# Patient Record
Sex: FEMALE | Race: BLACK OR AFRICAN AMERICAN | Employment: FULL TIME | URBAN - METROPOLITAN AREA
[De-identification: names, ages, dates, MRNs, and addresses within clinical notes are randomized per-mention and may not be internally consistent; named-entity substitution may affect disease eponyms.]

---

## 2017-01-11 ENCOUNTER — TELEPHONE (OUTPATIENT)
Dept: OBGYN CLINIC | Facility: CLINIC | Age: 27
End: 2017-01-11

## 2017-02-10 ENCOUNTER — HOSPITAL ENCOUNTER (OUTPATIENT)
Dept: PERINATAL CARE | Facility: HOSPITAL | Age: 27
Discharge: HOME | End: 2017-02-10
Attending: OBSTETRICS & GYNECOLOGY

## 2017-02-10 VITALS — DIASTOLIC BLOOD PRESSURE: 72 MMHG | SYSTOLIC BLOOD PRESSURE: 119 MMHG | HEART RATE: 88 BPM

## 2017-02-10 DIAGNOSIS — O43.122 INSERTION, VELAMENTOUS, UMBILICAL CORD, SECOND TRIMESTER: ICD-10-CM

## 2017-02-10 DIAGNOSIS — O43.122 INSERTION, VELAMENTOUS, UMBILICAL CORD, SECOND TRIMESTER: Primary | ICD-10-CM

## 2017-02-10 DIAGNOSIS — Z36.3 SCREENING, ANTENATAL, FOR MALFORMATION BY ULTRASOUND: ICD-10-CM

## 2017-02-10 PROCEDURE — 99214 OFFICE O/P EST MOD 30 MIN: CPT | Performed by: OBSTETRICS & GYNECOLOGY

## 2017-02-10 PROCEDURE — 76811 OB US DETAILED SNGL FETUS: CPT

## 2017-02-10 PROCEDURE — 76817 TRANSVAGINAL US OBSTETRIC: CPT | Performed by: OBSTETRICS & GYNECOLOGY

## 2017-02-10 PROCEDURE — 76811 OB US DETAILED SNGL FETUS: CPT | Performed by: OBSTETRICS & GYNECOLOGY

## 2017-02-10 NOTE — PROGRESS NOTES
Yovani Bush is a 27year old female. Reason for Consult:   Velamentous cord insertion seen on previous ultrasound. OB History:        NARRATIVE:   /72 mmHg  Pulse 88  LMP 2016          Alert and Oriented.   No acute distress Velamentous cords contain a single umbilical artery in 02.8 percent of cases.    Velamentous umbilical cord has been associated with several obstetrical complications, including fetal growth restriction, prematurity, congenital anomalies, low Apgar scores,

## 2017-02-11 PROBLEM — O26.879 SHORT CERVIX AFFECTING PREGNANCY: Status: ACTIVE | Noted: 2017-02-11

## 2017-02-14 ENCOUNTER — NURSE ONLY (OUTPATIENT)
Dept: OBGYN CLINIC | Facility: CLINIC | Age: 27
End: 2017-02-14

## 2017-02-14 ENCOUNTER — TELEPHONE (OUTPATIENT)
Dept: OBGYN CLINIC | Facility: CLINIC | Age: 27
End: 2017-02-14

## 2017-02-14 VITALS — BODY MASS INDEX: 26.34 KG/M2 | HEIGHT: 64.25 IN | WEIGHT: 154.25 LBS

## 2017-02-14 DIAGNOSIS — Z34.02 ENCOUNTER FOR SUPERVISION OF NORMAL FIRST PREGNANCY IN SECOND TRIMESTER: Primary | ICD-10-CM

## 2017-02-14 PROCEDURE — 99211 OFF/OP EST MAY X REQ PHY/QHP: CPT | Performed by: ADVANCED PRACTICE MIDWIFE

## 2017-02-14 RX ORDER — CHOLECALCIFEROL (VITAMIN D3) 25 MCG
1 TABLET,CHEWABLE ORAL DAILY
COMMUNITY
End: 2020-05-04

## 2017-02-14 NOTE — PROGRESS NOTES
NOB Education complete and information given to pt. NOB Labs ordered with AFP. Pt completed EPDS and scored 3. Per MJ, pt will schedule NPN appt with the MD's at [facility]. Contact information given to pt to call for appt. Pt desires Natural Childbirth.

## 2017-02-14 NOTE — TELEPHONE ENCOUNTER
Pt here for NOB RN visit. Talked with pt and notified that in light of the velamentous cord insertion and short cervix she has risked out of our care and we will transfer her to OB's.  To cont with Ed visit today and given contact info for MD's to schedule

## 2017-02-16 ENCOUNTER — HOSPITAL ENCOUNTER (OUTPATIENT)
Dept: PERINATAL CARE | Facility: HOSPITAL | Age: 27
Discharge: HOME | End: 2017-02-16
Attending: OBSTETRICS & GYNECOLOGY

## 2017-02-16 VITALS
HEART RATE: 100 BPM | BODY MASS INDEX: 26 KG/M2 | SYSTOLIC BLOOD PRESSURE: 123 MMHG | DIASTOLIC BLOOD PRESSURE: 71 MMHG | WEIGHT: 154.38 LBS | RESPIRATION RATE: 16 BRPM

## 2017-02-16 DIAGNOSIS — O26.879 SHORT CERVIX AFFECTING PREGNANCY: ICD-10-CM

## 2017-02-16 DIAGNOSIS — O43.122 INSERTION, VELAMENTOUS, UMBILICAL CORD, SECOND TRIMESTER: ICD-10-CM

## 2017-02-16 DIAGNOSIS — O26.879 SHORT CERVIX AFFECTING PREGNANCY: Primary | ICD-10-CM

## 2017-02-16 PROCEDURE — 76815 OB US LIMITED FETUS(S): CPT | Performed by: OBSTETRICS & GYNECOLOGY

## 2017-02-16 PROCEDURE — 76817 TRANSVAGINAL US OBSTETRIC: CPT

## 2017-02-16 PROCEDURE — 76817 TRANSVAGINAL US OBSTETRIC: CPT | Performed by: OBSTETRICS & GYNECOLOGY

## 2017-02-16 PROCEDURE — 99213 OFFICE O/P EST LOW 20 MIN: CPT | Performed by: OBSTETRICS & GYNECOLOGY

## 2017-02-16 NOTE — PROGRESS NOTES
MFM follow up, hx of short cervix. Cervical length  Positive fetal movement.   Feeling well, no complaints offered

## 2017-02-16 NOTE — PROGRESS NOTES
Muriel Patel is a 27year old female. Reason for Consult:   Velamentous cord insertion seen on previous ultrasound. Short cervix. Doing well. No complaints.     OB History:        NARRATIVE:   /71 mmHg  Pulse 100  Resp 16  Wt 154 lb 6.4 discussion with the patient.

## 2017-03-16 ENCOUNTER — TELEPHONE (OUTPATIENT)
Dept: OBGYN CLINIC | Facility: CLINIC | Age: 27
End: 2017-03-16

## 2017-03-16 NOTE — TELEPHONE ENCOUNTER
OCTAVIATCANTONIO. Pt has overdue labs. Need to know if pt is continuing her OB care with the MDs at [facility].

## 2017-06-07 ENCOUNTER — TELEPHONE (OUTPATIENT)
Dept: OBGYN CLINIC | Facility: CLINIC | Age: 27
End: 2017-06-07

## 2017-06-18 ENCOUNTER — ANESTHESIA (OUTPATIENT)
Dept: OBGYN UNIT | Facility: HOSPITAL | Age: 27
End: 2017-06-18

## 2017-06-18 ENCOUNTER — HOSPITAL ENCOUNTER (INPATIENT)
Facility: HOSPITAL | Age: 27
LOS: 2 days | Discharge: HOME | End: 2017-06-20
Attending: OBSTETRICS & GYNECOLOGY | Admitting: OBSTETRICS & GYNECOLOGY

## 2017-06-18 ENCOUNTER — ANESTHESIA EVENT (OUTPATIENT)
Dept: OBGYN UNIT | Facility: HOSPITAL | Age: 27
End: 2017-06-18

## 2017-06-18 PROBLEM — O42.90 PREMATURE RUPTURE OF MEMBRANES: Status: ACTIVE | Noted: 2017-06-18

## 2017-06-18 PROBLEM — Z34.90 PREGNANCY: Status: ACTIVE | Noted: 2017-06-18

## 2017-06-18 PROBLEM — O09.30 LIMITED PRENATAL CARE: Status: ACTIVE | Noted: 2017-06-18

## 2017-06-18 PROCEDURE — 0UQMXZZ REPAIR VULVA, EXTERNAL APPROACH: ICD-10-PCS | Performed by: OBSTETRICS & GYNECOLOGY

## 2017-06-18 PROCEDURE — 0KQM0ZZ REPAIR PERINEUM MUSCLE, OPEN APPROACH: ICD-10-PCS | Performed by: OBSTETRICS & GYNECOLOGY

## 2017-06-18 PROCEDURE — 86901 BLOOD TYPING SEROLOGIC RH(D): CPT | Performed by: ADVANCED PRACTICE MIDWIFE

## 2017-06-18 PROCEDURE — 86900 BLOOD TYPING SEROLOGIC ABO: CPT | Performed by: ADVANCED PRACTICE MIDWIFE

## 2017-06-18 RX ORDER — PHENYLEPHRINE HCL IN 0.9% NACL 0.5 MG/5ML
SYRINGE (ML) INTRAVENOUS
Status: DISCONTINUED
Start: 2017-06-18 | End: 2017-06-18 | Stop reason: WASHOUT

## 2017-06-18 RX ORDER — SODIUM CHLORIDE 0.9 % (FLUSH) 0.9 %
10 SYRINGE (ML) INJECTION AS NEEDED
Status: DISCONTINUED | OUTPATIENT
Start: 2017-06-18 | End: 2017-06-18

## 2017-06-18 RX ORDER — AMMONIA INHALANTS 0.04 G/.3ML
0.3 INHALANT RESPIRATORY (INHALATION) AS NEEDED
Status: DISCONTINUED | OUTPATIENT
Start: 2017-06-18 | End: 2017-06-18

## 2017-06-18 RX ORDER — HYDROCODONE BITARTRATE AND ACETAMINOPHEN 5; 325 MG/1; MG/1
1 TABLET ORAL EVERY 4 HOURS PRN
Status: DISCONTINUED | OUTPATIENT
Start: 2017-06-18 | End: 2017-06-20

## 2017-06-18 RX ORDER — SODIUM CHLORIDE, SODIUM LACTATE, POTASSIUM CHLORIDE, CALCIUM CHLORIDE 600; 310; 30; 20 MG/100ML; MG/100ML; MG/100ML; MG/100ML
INJECTION, SOLUTION INTRAVENOUS
Status: COMPLETED
Start: 2017-06-18 | End: 2017-06-18

## 2017-06-18 RX ORDER — IBUPROFEN 600 MG/1
600 TABLET ORAL EVERY 6 HOURS PRN
Status: DISCONTINUED | OUTPATIENT
Start: 2017-06-18 | End: 2017-06-20

## 2017-06-18 RX ORDER — ACETAMINOPHEN 325 MG/1
650 TABLET ORAL EVERY 4 HOURS PRN
Status: DISCONTINUED | OUTPATIENT
Start: 2017-06-18 | End: 2017-06-20

## 2017-06-18 RX ORDER — BUPIVACAINE HYDROCHLORIDE 2.5 MG/ML
INJECTION, SOLUTION EPIDURAL; INFILTRATION; INTRACAUDAL AS NEEDED
Status: DISCONTINUED | OUTPATIENT
Start: 2017-06-18 | End: 2017-06-18 | Stop reason: SURG

## 2017-06-18 RX ORDER — LIDOCAINE HYDROCHLORIDE AND EPINEPHRINE 15; 5 MG/ML; UG/ML
INJECTION, SOLUTION EPIDURAL AS NEEDED
Status: DISCONTINUED | OUTPATIENT
Start: 2017-06-18 | End: 2017-06-18 | Stop reason: SURG

## 2017-06-18 RX ORDER — NALBUPHINE HCL 10 MG/ML
10 AMPUL (ML) INJECTION
Status: DISCONTINUED | OUTPATIENT
Start: 2017-06-18 | End: 2017-06-18 | Stop reason: HOSPADM

## 2017-06-18 RX ORDER — NALBUPHINE HCL 10 MG/ML
2.5 AMPUL (ML) INJECTION
Status: DISCONTINUED | OUTPATIENT
Start: 2017-06-18 | End: 2017-06-20

## 2017-06-18 RX ORDER — DOCUSATE SODIUM 100 MG/1
100 CAPSULE, LIQUID FILLED ORAL 2 TIMES DAILY
Status: DISCONTINUED | OUTPATIENT
Start: 2017-06-18 | End: 2017-06-20

## 2017-06-18 RX ORDER — PRENATAL VIT,CAL 76/IRON/FOLIC 29 MG-1 MG
1 TABLET ORAL DAILY
Status: DISCONTINUED | OUTPATIENT
Start: 2017-06-18 | End: 2017-06-20

## 2017-06-18 RX ORDER — LIDOCAINE HYDROCHLORIDE 10 MG/ML
INJECTION, SOLUTION EPIDURAL; INFILTRATION; INTRACAUDAL; PERINEURAL AS NEEDED
Status: DISCONTINUED | OUTPATIENT
Start: 2017-06-18 | End: 2017-06-18 | Stop reason: SURG

## 2017-06-18 RX ORDER — BUPIVACAINE HYDROCHLORIDE 2.5 MG/ML
INJECTION, SOLUTION EPIDURAL; INFILTRATION; INTRACAUDAL
Status: DISPENSED
Start: 2017-06-18 | End: 2017-06-19

## 2017-06-18 RX ORDER — SODIUM CHLORIDE 0.9 % (FLUSH) 0.9 %
10 SYRINGE (ML) INJECTION AS NEEDED
Status: DISCONTINUED | OUTPATIENT
Start: 2017-06-18 | End: 2017-06-20

## 2017-06-18 RX ORDER — BISACODYL 10 MG
10 SUPPOSITORY, RECTAL RECTAL ONCE AS NEEDED
Status: DISCONTINUED | OUTPATIENT
Start: 2017-06-18 | End: 2017-06-20

## 2017-06-18 RX ORDER — SODIUM CHLORIDE, SODIUM LACTATE, POTASSIUM CHLORIDE, CALCIUM CHLORIDE 600; 310; 30; 20 MG/100ML; MG/100ML; MG/100ML; MG/100ML
INJECTION, SOLUTION INTRAVENOUS
Status: DISPENSED
Start: 2017-06-18 | End: 2017-06-19

## 2017-06-18 RX ORDER — LIDOCAINE HYDROCHLORIDE 10 MG/ML
30 INJECTION, SOLUTION EPIDURAL; INFILTRATION; INTRACAUDAL; PERINEURAL ONCE
Status: DISCONTINUED | OUTPATIENT
Start: 2017-06-18 | End: 2017-06-18

## 2017-06-18 RX ORDER — DIAPER,BRIEF,INFANT-TODD,DISP
1 EACH MISCELLANEOUS EVERY 6 HOURS PRN
Status: DISCONTINUED | OUTPATIENT
Start: 2017-06-18 | End: 2017-06-20

## 2017-06-18 RX ORDER — EPHEDRINE SULFATE/0.9% NACL/PF 25 MG/5 ML
5 SYRINGE (ML) INTRAVENOUS AS NEEDED
Status: DISCONTINUED | OUTPATIENT
Start: 2017-06-18 | End: 2017-06-18

## 2017-06-18 RX ORDER — HYDROCODONE BITARTRATE AND ACETAMINOPHEN 5; 325 MG/1; MG/1
2 TABLET ORAL EVERY 4 HOURS PRN
Status: DISCONTINUED | OUTPATIENT
Start: 2017-06-18 | End: 2017-06-20

## 2017-06-18 RX ORDER — ONDANSETRON 2 MG/ML
4 INJECTION INTRAMUSCULAR; INTRAVENOUS EVERY 6 HOURS PRN
Status: DISCONTINUED | OUTPATIENT
Start: 2017-06-18 | End: 2017-06-20

## 2017-06-18 RX ORDER — TERBUTALINE SULFATE 1 MG/ML
0.25 INJECTION, SOLUTION SUBCUTANEOUS AS NEEDED
Status: DISCONTINUED | OUTPATIENT
Start: 2017-06-18 | End: 2017-06-18

## 2017-06-18 RX ORDER — AMMONIA INHALANTS 0.04 G/.3ML
0.3 INHALANT RESPIRATORY (INHALATION) AS NEEDED
Status: DISCONTINUED | OUTPATIENT
Start: 2017-06-18 | End: 2017-06-20

## 2017-06-18 RX ORDER — LIDOCAINE HYDROCHLORIDE AND EPINEPHRINE 20; 5 MG/ML; UG/ML
INJECTION, SOLUTION EPIDURAL; INFILTRATION; INTRACAUDAL; PERINEURAL
Status: DISCONTINUED
Start: 2017-06-18 | End: 2017-06-18 | Stop reason: WASHOUT

## 2017-06-18 RX ORDER — IBUPROFEN 600 MG/1
600 TABLET ORAL ONCE AS NEEDED
Status: DISCONTINUED | OUTPATIENT
Start: 2017-06-18 | End: 2017-06-18

## 2017-06-18 RX ORDER — DEXTROSE, SODIUM CHLORIDE, SODIUM LACTATE, POTASSIUM CHLORIDE, AND CALCIUM CHLORIDE 5; .6; .31; .03; .02 G/100ML; G/100ML; G/100ML; G/100ML; G/100ML
125 INJECTION, SOLUTION INTRAVENOUS CONTINUOUS
Status: DISCONTINUED | OUTPATIENT
Start: 2017-06-18 | End: 2017-06-18

## 2017-06-18 RX ORDER — NALBUPHINE HCL 10 MG/ML
10 AMPUL (ML) INJECTION
Status: DISCONTINUED | OUTPATIENT
Start: 2017-06-18 | End: 2017-06-18

## 2017-06-18 RX ORDER — SIMETHICONE 80 MG
80 TABLET,CHEWABLE ORAL 3 TIMES DAILY PRN
Status: DISCONTINUED | OUTPATIENT
Start: 2017-06-18 | End: 2017-06-20

## 2017-06-18 RX ADMIN — LIDOCAINE HYDROCHLORIDE 1 ML: 10 INJECTION, SOLUTION EPIDURAL; INFILTRATION; INTRACAUDAL; PERINEURAL at 17:07:00

## 2017-06-18 RX ADMIN — BUPIVACAINE HYDROCHLORIDE 10 ML: 2.5 INJECTION, SOLUTION EPIDURAL; INFILTRATION; INTRACAUDAL at 17:10:00

## 2017-06-18 RX ADMIN — LIDOCAINE HYDROCHLORIDE AND EPINEPHRINE 5 ML: 15; 5 INJECTION, SOLUTION EPIDURAL at 17:10:00

## 2017-06-18 NOTE — ANESTHESIA PREPROCEDURE EVALUATION
Anesthesia PreOp Note    HPI:     Tsering Lowe is a 24year old female who presents for preoperative consultation requested by: * No surgeons listed *    Date of Surgery: * No dates entered *    * No procedures listed *  Indication: * No pre-op diagnosis e solution 0.3 mL 0.3 mL Nasal PRN Scarlett Varma MD     Nalbuphine HCl (NUBAIN) injection 10 mg 10 mg Intravenous Q3H PRN Scarlett Varma MD     oxyTOCIN (PITOCIN) 30 units/ 500 ml premix infusion 0.5-20 milan-units/min Intravenous Continuous Pasty Morning 24.2* 06/18/2017   MCHC 32.2 06/18/2017   RDW 17.9* 06/18/2017    06/18/2017   MPV 7.2* 06/18/2017             Vital Signs: Body mass index is 27.29 kg/(m^2). height is 1.651 m (5' 5\") and weight is 74.39 kg (164 lb). Her oral temperature is 97.

## 2017-06-18 NOTE — PROGRESS NOTES
[facility]    Labor Progress Note    Robyn Ventura Patient Status:  Inpatient    5/10/1990 MRN [id number]   Location [facility] Attending Lisa Gonzalez MD   Hosp Day # 0 PCP No primary care provider on file.        Subjecti

## 2017-06-18 NOTE — H&P
[address] Patient Status:  Inpatient    5/10/1990 MRN [id number]   Location [address] C-D Attending Kriss Joya MD   Hosp Day # 0 PCP No primary care provider on file.      Date of Adm [95] 95  BP: (118)/(81) 118/81 mmHg     Constitution:  WDWN in NAD   HEENT:  WNL   Neck:  no thyromegaly or lymphadenopathy    Lungs:   clear to ausculation bilaterally    Heart:   regular rate and rhythm   Abdomen:  soft, nontender, nondistended, no abnor

## 2017-06-18 NOTE — ANESTHESIA PROCEDURE NOTES
Labor Analgesia  Performed by: Lelo Mac  Authorized by: Lelo Mac    Patient Location:  OB  Start Time:  6/18/2017 5:04 PM  End Time:  6/18/2017 5:15 PM  Site Identification: surface landmarks    Reason for Block: labor epidural    Anesthesiologis

## 2017-06-19 RX ORDER — AZITHROMYCIN 250 MG/1
1000 TABLET, FILM COATED ORAL ONCE
Status: COMPLETED | OUTPATIENT
Start: 2017-06-19 | End: 2017-06-19

## 2017-06-19 NOTE — ANESTHESIA POSTPROCEDURE EVALUATION
Patient: Muriel Patel    Procedure Summary     Date Anesthesia Start Anesthesia Stop Room / Location    06/18/17 0040 0724        Procedure Diagnosis Scheduled Providers Responsible Provider    LABOR ANALGESIA No diagnosis on file.   Aleks Padilla MD

## 2017-06-19 NOTE — L&D DELIVERY NOTE
[facility]    Vaginal Delivery Note    Kelley Newer Patient Status:  Inpatient    5/10/1990 MRN [id number]   Location [facility] Attending Conchita Echols MD   Hosp Day # 0 PCP No primary care provider on file.      Delivery

## 2017-06-19 NOTE — PROGRESS NOTES
Discussed circumcision with pt and SA. Informed them that male infant's penis appears too small for circumcision d/t SGA. I demonstrated by showing an actual Gomco bell to parents.  Pt and SA agreed that baby is too small and would defer their desire for ci

## 2017-06-19 NOTE — LACTATION NOTE
This note was copied from the chart of Sang Wiley.   LACTATION NOTE - INFANT    Evaluation Type  Evaluation Type: Inpatient    Problems & Assessment  Infant Assessment: Minimal hunger cues present;Skin color: pink or appropriate for ethnicity    Feeding Asse

## 2017-06-19 NOTE — LACTATION NOTE
LACTATION NOTE - MOTHER           Problems identified  Problems identified: Knowledge deficit    Maternal history  Other/comment:  (limited prenatal care)    Breastfeeding goal  Breastfeeding goal: To maintain breast milk feeding per patient goal    [facility]

## 2017-06-19 NOTE — PROGRESS NOTES
[place] [facility]    OB/GYNE Progress Note      Elana Lazaro Patient Status:  Inpatient    5/10/1990 MRN [id number]   Location [facility] 3SE Attending Marry Dahl MD   Hosp Day # 1 PCP No primary care provider on file.        Assessm

## 2017-06-19 NOTE — PROGRESS NOTES
PT TRANSFERRED TO MB UNIT VIA WHEELCHAIR HOLDING , BOTH IN STABLE CONDITION, SIGNIFICANT OTHER PRESENT, REPORT GIVEN TO SELF.  ORIENTED PT TO ENVT, ID BANDS VERIFIED, PLAN OF CARE REVIEWED, PT STATES VERBAL UNDERSTANDING, NO FURTHER QUESTIONS AT THIS

## 2017-06-19 NOTE — PROGRESS NOTES
Alerted that Chlam. test from pt was positive. Pt delivered yesterday with poor prenatal care. RN notified Pediatrician. Pt and SA informed of test results. Pt to be treated with Azithromycin 1g PO x 1. Explained that baby will get blood cx.  All questions

## 2017-06-20 VITALS
BODY MASS INDEX: 27.32 KG/M2 | HEART RATE: 97 BPM | OXYGEN SATURATION: 96 % | SYSTOLIC BLOOD PRESSURE: 114 MMHG | HEIGHT: 65 IN | RESPIRATION RATE: 16 BRPM | TEMPERATURE: 98 F | WEIGHT: 164 LBS | DIASTOLIC BLOOD PRESSURE: 77 MMHG

## 2017-06-20 PROBLEM — O98.819 CHLAMYDIA INFECTION COMPLICATING PREGNANCY: Status: ACTIVE | Noted: 2017-06-20

## 2017-06-20 PROBLEM — A74.9 CHLAMYDIA INFECTION COMPLICATING PREGNANCY: Status: ACTIVE | Noted: 2017-06-20

## 2017-06-20 PROCEDURE — 99238 HOSP IP/OBS DSCHRG MGMT 30/<: CPT | Performed by: OBSTETRICS & GYNECOLOGY

## 2017-06-20 RX ORDER — IBUPROFEN 600 MG/1
600 TABLET ORAL EVERY 6 HOURS PRN
Qty: 30 TABLET | Refills: 0 | Status: SHIPPED | OUTPATIENT
Start: 2017-06-20

## 2017-06-20 NOTE — DISCHARGE SUMMARY
[facility] AND [facility]  Discharge Summary    Nadeem Green Patient Status:  Inpatient    5/10/1990 MRN [id number]   Location [facility] 3SE Attending Erika Mahajan MD   Hosp Day # 2 PCP No primary care provider on file.          Admit date: 2017

## 2017-06-20 NOTE — LACTATION NOTE
LACTATION NOTE - MOTHER           Problems identified  Problems identified: Knowledge deficit;Milk supply WNL    Maternal history  Other/comment:  (limited prenatal care)    Breastfeeding goal  Breastfeeding goal: To maintain breast milk feeding per patien

## 2017-06-20 NOTE — PROGRESS NOTES
[place] [facility] - [facility]    OB/GYNE Progress Note      Renaye Dense Patient Status:  Inpatient    5/10/1990 MRN [id number]   Location [facility] 3SE Attending Maurisio Moeller MD   Hosp Day # 2 PCP No primary care provider on file.        Assessm Bonny Castleman, BLDCUL, [facility]          Izabela Figueroa MD  6/20/2017  9:23 AM

## 2020-05-04 ENCOUNTER — OFFICE VISIT (OUTPATIENT)
Dept: OBGYN CLINIC | Facility: CLINIC | Age: 30
End: 2020-05-04
Payer: OTHER GOVERNMENT

## 2020-05-04 ENCOUNTER — LAB ENCOUNTER (OUTPATIENT)
Dept: LAB | Facility: HOSPITAL | Age: 30
End: 2020-05-04
Attending: OBSTETRICS & GYNECOLOGY
Payer: OTHER GOVERNMENT

## 2020-05-04 VITALS
BODY MASS INDEX: 26 KG/M2 | WEIGHT: 159 LBS | DIASTOLIC BLOOD PRESSURE: 84 MMHG | SYSTOLIC BLOOD PRESSURE: 119 MMHG | HEART RATE: 90 BPM

## 2020-05-04 DIAGNOSIS — Z01.419 ENCOUNTER FOR GYNECOLOGICAL EXAMINATION WITHOUT ABNORMAL FINDING: Primary | ICD-10-CM

## 2020-05-04 DIAGNOSIS — N89.8 VAGINAL IRRITATION: ICD-10-CM

## 2020-05-04 DIAGNOSIS — Z12.4 CERVICAL CANCER SCREENING: ICD-10-CM

## 2020-05-04 DIAGNOSIS — N89.8 VAGINAL DISCHARGE: ICD-10-CM

## 2020-05-04 DIAGNOSIS — N76.1 CHRONIC VAGINITIS: ICD-10-CM

## 2020-05-04 DIAGNOSIS — Z11.3 SCREEN FOR STD (SEXUALLY TRANSMITTED DISEASE): ICD-10-CM

## 2020-05-04 PROCEDURE — 36415 COLL VENOUS BLD VENIPUNCTURE: CPT

## 2020-05-04 PROCEDURE — 99213 OFFICE O/P EST LOW 20 MIN: CPT | Performed by: OBSTETRICS & GYNECOLOGY

## 2020-05-04 PROCEDURE — 99395 PREV VISIT EST AGE 18-39: CPT | Performed by: OBSTETRICS & GYNECOLOGY

## 2020-05-04 PROCEDURE — 86803 HEPATITIS C AB TEST: CPT

## 2020-05-04 PROCEDURE — 86780 TREPONEMA PALLIDUM: CPT

## 2020-05-04 PROCEDURE — 87340 HEPATITIS B SURFACE AG IA: CPT

## 2020-05-04 PROCEDURE — 87389 HIV-1 AG W/HIV-1&-2 AB AG IA: CPT

## 2020-05-04 NOTE — PROGRESS NOTES
Well Woman Exam    HPI:  The patient is a 27yo female who delivered with Dr. Carlos Sheehan many years ago. She presents today for annual exam as well as abnormal discharge. Pt has had a long history of vulvar and vaginal irritation with white/yellow discharge.  Renate Arrieta Physical activity:        Days per week: Not on file        Minutes per session: Not on file      Stress: Not on file    Relationships      Social connections:        Talks on phone: Not on file        Gets together: Not on file        Attends [employer] incontin[employer]  Heme: Denies easy bruising   Neurological:  denies headaches, blurred vision   Psychiatric: denies depression or anxiety, and thoughts of harming herself        05/04/20  0917   BP: 119/84   Pulse: 90       PHYSICAL EXAM:   Constitutional: we 4. Vulvar skin care guidelines   5. STD follow up   6.  Follow up in 1 year     I spent 15 minutes of the visit discussing vaginal discharge and vulvar skin care guidelines and more than 50% was face to face

## 2020-05-06 ENCOUNTER — TELEPHONE (OUTPATIENT)
Dept: OBGYN CLINIC | Facility: CLINIC | Age: 30
End: 2020-05-06

## 2020-05-06 RX ORDER — FLUCONAZOLE 150 MG/1
TABLET ORAL
Qty: 2 TABLET | Refills: 0 | Status: SHIPPED | OUTPATIENT
Start: 2020-05-06

## 2020-05-06 RX ORDER — METRONIDAZOLE 500 MG/1
TABLET ORAL
Qty: 14 TABLET | Refills: 0 | Status: SHIPPED | OUTPATIENT
Start: 2020-05-06

## 2020-05-06 NOTE — TELEPHONE ENCOUNTER
Please let patient know that she is positive for BV and Yeast. Please send Flagyl 500mg BID for 7 days to pharmacy as well as Diflucan 150mg x2 doses.

## 2020-05-06 NOTE — TELEPHONE ENCOUNTER
Informed pt that [address] stated she is positive for BV and yeast. Informed pt that a rx will be sent for Flagyl 500mg bid x 7 days to pharmacy and Diflucan 150mg x 2 doses.

## 2024-02-02 ENCOUNTER — HOSPITAL ENCOUNTER (EMERGENCY)
Facility: HOSPITAL | Age: 34
Discharge: HOME | End: 2024-02-02
Attending: STUDENT IN AN ORGANIZED HEALTH CARE EDUCATION/TRAINING PROGRAM
Payer: MEDICAID

## 2024-02-02 VITALS
SYSTOLIC BLOOD PRESSURE: 120 MMHG | TEMPERATURE: 98 F | RESPIRATION RATE: 18 BRPM | HEIGHT: 65 IN | OXYGEN SATURATION: 100 % | WEIGHT: 165 LBS | BODY MASS INDEX: 27.49 KG/M2 | DIASTOLIC BLOOD PRESSURE: 86 MMHG | HEART RATE: 78 BPM

## 2024-02-02 DIAGNOSIS — T14.8XXA SKIN AVULSION: Primary | ICD-10-CM

## 2024-02-02 PROCEDURE — 99283 EMERGENCY DEPT VISIT LOW MDM: CPT

## 2024-02-02 PROCEDURE — 90471 IMMUNIZATION ADMIN: CPT

## 2024-02-02 NOTE — ED PROVIDER NOTES
Patient Seen in: [facility] Emergency Department      History     Chief Complaint   Patient presents with    Laceration/Abrasion     Stated Complaint: L 1st finger laceration.    Subjective:   HPI    33-year-old female presenting for evaluation of a thumb injury.  Just prior to arrival injured right thumb while using a mandolin.  Piece of food got stuck in the device that she tried to push through with her thumb and sustained a skin avulsion to the radial aspect of her right thumb.  Unsure last tetanus.  No numbness weakness or tingling.  No other injuries.  Bleeding is controlled at this time.    Objective:   Past Medical History:   Diagnosis Date    Decorative tattoo     History of chicken pox age 8              History reviewed. No pertinent surgical history.             Social History     Socioeconomic History    Marital status: Single     Spouse name: Delmar Reyes    Number of children: 0    Years of education: 14   Occupational History    Occupation:      Comment: Full Time   Tobacco Use    Smoking status: Never    Smokeless tobacco: Never   Substance and Sexual Activity    Alcohol use: Yes     Alcohol/week: 8.0 standard drinks of alcohol     Types: 8 Shots of liquor per week    Drug use: No   Other Topics Concern    Blood Transfusions No    Caffeine Concern No    Occupational Exposure No    Weight Concern No    Special Diet Yes     Comment: No Red Meat or fried food    Exercise No    Bike Helmet No    Seat Belt Yes              Review of Systems    Positive for stated complaint: L 1st finger laceration.  Other systems are as noted in HPI.  Constitutional and vital signs reviewed.      All other systems reviewed and negative except as noted above.    Physical Exam     ED Triage Vitals [02/02/24 1223]   /88   Pulse 89   Resp 18   Temp 98 °F (36.7 °C)   Temp src Temporal   SpO2 98 %   O2 Device None (Room air)       Current:/90   Pulse 75   Temp 98 °F (36.7 °C) (Temporal)    Resp 18   Ht 165.1 cm (5' 5\")   Wt 74.8 kg   SpO2 99%   BMI 27.46 kg/m²         Physical Exam    Constitutional: awake, alert, no sig distress  HENT: mmm, no lesions,  Neck: normal range of motion, no tenderness, supple.  Eyes: PERRL, EOMI, conjunctiva normal, no discharge. Sclera anicteric.  Cardiovascular: rr no murmur  Respiratory: Normal breath sounds, no respiratory distress, no wheezing, no chest tenderness.  GI: Bowel sounds normal, Soft, no tenderness, no masses, no pulsatile masses.  : No CVA tenderness.  Skin: Warm, dry, no erythema, skin avulsion of the radial aspect of right first digit - no active bleeding or foreign body.   Musculoskeletal: Intact distal pulses, no edema, no tenderness, no cyanosis, no clubbing. Good range of motion in all major joints. No tenderness to palpation or major deformities noted. Back- No tenderness.  Neurologic: Alert & oriented x 3, normal motor function, normal sensory function, no focal deficits noted.  Psych: Calm, cooperative, nl affect        ED Course   Labs Reviewed - No data to display                   MDM      33F hx as above presenting with avulsion injury from mandolin. On arrival VSS, reassuring  -No evidence of neurovascular injury, retained foreign body, or uncontrolled bleeding  TDAP updated.  -Not able to close primarily d/t tissue loss, will need to heal by secondary intention. Discussed with patient. Discussed return precautions and follow up instructions with patient who voiced understanding and agreement with the plan. All questions answered.                                  Medical Decision Making      Disposition and Plan     Clinical Impression:  1. Skin avulsion         Disposition:  Discharge  2/2/2024  1:30 pm    Follow-up:  [facility] Emergency Department  [address] [facility] [address]  [phone]  Follow up  As needed, If symptoms worsen    Koko Arguelles DO  [address] [place] [address]  [address]  [facility]  [address]  [phone]    Call today            Medications Prescribed:  Current Discharge Medication List

## 2024-02-02 NOTE — ED INITIAL ASSESSMENT (HPI)
Pt to ED from work after injury to right 5th finger from [facility]. Bleeding controlled upon arrival. Denies thinners or asa. Avulsion noted.

## (undated) NOTE — IP AVS SNAPSHOT
Patient Demographics     Address Phone E-mail Address    Marty VERNON [address] [address] [phone] [facility]  [phone] [facility] [email or web address]      Emergency Contact(s)     Name Relation Home Work [facility] 021-

## (undated) NOTE — IP AVS SNAPSHOT
[address]  [address] [facility] [phone]                Discharge Summary   6/18/2017    Adwoa Angel           Admission Information        Provider Department    6/18/2017 Brooke Almendarez MD [facility] 3se Discharge References/Attachments     AFTER A VAGINAL BIRTH (ENGLISH)         Preeclampsia/Hypertension       Preeclampsia is a serious disease related to high blood pressure (hypertension).   Preeclampsia/hypertension can happen during pregnancy and up to 6 31.6 (L) (06/18/17)  75.3 (L)    (06/18/17)  261 (06/18/17)  7.2 (L)      Radiology Exams     None      Patient Education    Lactation Education-Mother Chart  Resolved   Pumping and breastfeeding frequency guidelines Resolved   Position and deep latch tech Signs and symptoms / prevention / management of thrush if applicable Resolved   Hospital grade vs. personal pump use Resolved   Obtaining pump for home / work use, if applicable Resolved   Storage and handling of breastmilk Resolved   Hand expression Resol Pain Management Resolved   Fetal Monitoring Resolved   Delivery Process Resolved   Tocolytics Resolved   Antibiotics Resolved   Psychosocial/Spiritual Support Resolved   Community Resources Resolved   Preeclampsia/Hypertension Resolved         Handwashing discharge instructions in [employer]vment[employer] by going to Visits < Admission Summaries. If you've been to the Emergency Department or your doctor's office, you can view your past visit information in Payvmenthart by going to Visits < Visit Summaries. Kwicr questions?

## (undated) NOTE — MR AVS SNAPSHOT
After Visit Summary   5/4/2020    Aron Weinberg    MRN: [id number]           Visit Information     Date & Time  5/4/2020  9:20 AM Provider  Clarence Schneider MD [address] [place] [address], [address] [place] [address],[address], [place].  Phone  [phone] Tips for making healthy food choices    Enjoy your food, but eat less. Fully enjoy your food when eating. Don’t eat while distracted and slow down. Avoid over sized portions. Don’t eat while when you’re bored.      EAT THESE FOODS MORE OFTEN: EAT THE [email or web address]                   DO YOU KNOW WHERE TO GO? Injury & Illness are never convenient. If you are dealing with a   non-emergency, consider your options before heading to an ER.   VIDEO VISITS  Visit face-to-face with visit [facility] s.r.l/ImmediateCare or call [phone]. ([phone])

## (undated) NOTE — IP AVS SNAPSHOT
[address]  602 [facility] ~ [phone]                Discharge Summary   2/16/2017    Chemo Mckeon           Admission Information        Provider Department    2/16/2017 Joce Howell MD [facility] [address]  [address] [place], [place]    [place] [address]  [address] [place], [place]    It is the patient's responsibility to check with and follow their insurance company's guidelines for prior authorization office, you can view your past visit information in Chi2gel by going to Visits < Visit Summaries. Chi[employer]gel questions? Call [phone] for help. [employer] is NOT to be used for urgent needs.   For medical emergencies, dial 911.             _____________

## (undated) NOTE — IP AVS SNAPSHOT
Patient Demographics     Address Phone E-mail Address    Marty VERNON [address] [address] [phone] [facility]  [phone] [email or web address]. [email or web address]      Emergency Contact(s)     Name Relation Home Work [facility] 728-

## (undated) NOTE — IP AVS SNAPSHOT
[address] [place] [address] [phone]                Discharge Summary   2/10/2017    Ashwin Tolbert           Admission Information        Provider Department    2/10/2017 Yuko Mendoza MD [facility] Main (Blue Parking) (Yellow Parking)  [address]   [address] [facility],  Shahbaze Michael Mustafa Said, [address] [facility]  [address]  [address] [facility] information, go to [email or web address] and click on the Sign Up Now link in the [employer] box. Enter your [email or web address] Activation Code exactly as it appears below along with your Zip Code and Date of Birth to complete the sign-up process.  If you do

## (undated) NOTE — MR AVS SNAPSHOT
[address]  [phone]               Thank you for choosing us for your health care visit with Nurse.   We are glad to serve you and happy to provide you with this summary of your vis T PALLIDUM SCREENING CASCADE    Complete by:  Feb 14, 2017 (Approximate)    Assoc Dx:  Encounter for supervision of normal first pregnancy in second trimester [Z34.02]           Hepatitis B Surface Antigen    Complete by:  Feb 14, 2017 (Approximate)    As office, you can view your past visit information in Save22 by going to Visits < Visit Summaries. Save22 questions? Call [phone] for help. Save22 is NOT to be used for urgent needs. For medical emergencies, dial 911.            Visit EDWARD-EL

## (undated) NOTE — Clinical Note
3/20/2017              Josette Wiley        [address]  [facility] [address]         Dear Madelyn You,    This letter is to inform you that our office has made several attempts to reach you by phone without success.   We were attempting to contact